# Patient Record
Sex: MALE | Race: WHITE
[De-identification: names, ages, dates, MRNs, and addresses within clinical notes are randomized per-mention and may not be internally consistent; named-entity substitution may affect disease eponyms.]

---

## 2021-06-29 ENCOUNTER — HOSPITAL ENCOUNTER (EMERGENCY)
Dept: HOSPITAL 4 - SED | Age: 21
Discharge: HOME | End: 2021-06-29
Payer: COMMERCIAL

## 2021-06-29 VITALS — HEIGHT: 70 IN | BODY MASS INDEX: 19.33 KG/M2 | WEIGHT: 135 LBS

## 2021-06-29 VITALS — SYSTOLIC BLOOD PRESSURE: 133 MMHG

## 2021-06-29 VITALS — SYSTOLIC BLOOD PRESSURE: 151 MMHG
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
## 2021-06-29 DIAGNOSIS — F12.90: ICD-10-CM

## 2021-06-29 DIAGNOSIS — S93.601A: Primary | ICD-10-CM
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
## 2021-06-29 NOTE — NUR
Patient given written and verbal discharge instructions and verbalizes 
understanding.  Dr. Deng discussed with patient the results and treatment 
provided. Patient in stable condition. ID arm band removed. 

 Patient educated on pain management and to follow up with PMD. Pain Scale 3.

Opportunity for questions provided and answered. Medication side effect fact 
sheet provided.

## 2021-06-29 NOTE — NUR
Pt. came in with c/o hurting right pinky toe 3 weeks ago and still having pain 
with walking and any kind of movement, no pain when at rest